# Patient Record
Sex: FEMALE | Race: OTHER | HISPANIC OR LATINO | URBAN - METROPOLITAN AREA
[De-identification: names, ages, dates, MRNs, and addresses within clinical notes are randomized per-mention and may not be internally consistent; named-entity substitution may affect disease eponyms.]

---

## 2017-04-03 ENCOUNTER — EMERGENCY (EMERGENCY)
Facility: HOSPITAL | Age: 22
LOS: 1 days | Discharge: HOME | End: 2017-04-03
Attending: EMERGENCY MEDICINE
Payer: SELF-PAY

## 2017-04-03 VITALS
SYSTOLIC BLOOD PRESSURE: 107 MMHG | OXYGEN SATURATION: 98 % | WEIGHT: 179.9 LBS | TEMPERATURE: 98 F | RESPIRATION RATE: 16 BRPM | HEIGHT: 62 IN | HEART RATE: 64 BPM | DIASTOLIC BLOOD PRESSURE: 57 MMHG

## 2017-04-03 DIAGNOSIS — R51 HEADACHE: ICD-10-CM

## 2017-04-03 DIAGNOSIS — L03.211 CELLULITIS OF FACE: ICD-10-CM

## 2017-04-03 PROCEDURE — 99283 EMERGENCY DEPT VISIT LOW MDM: CPT

## 2017-04-03 RX ORDER — IBUPROFEN 200 MG
600 TABLET ORAL ONCE
Qty: 0 | Refills: 0 | Status: COMPLETED | OUTPATIENT
Start: 2017-04-03 | End: 2017-04-03

## 2017-04-03 RX ORDER — IBUPROFEN 200 MG
1 TABLET ORAL
Qty: 30 | Refills: 0 | OUTPATIENT
Start: 2017-04-03 | End: 2017-04-13

## 2017-04-03 RX ORDER — AZTREONAM 2 G
1 VIAL (EA) INJECTION
Qty: 20 | Refills: 0 | OUTPATIENT
Start: 2017-04-03 | End: 2017-04-13

## 2017-04-03 RX ADMIN — Medication 1 TABLET(S): at 15:22

## 2017-04-03 RX ADMIN — Medication 600 MILLIGRAM(S): at 15:22

## 2017-04-03 NOTE — ED ADULT NURSE NOTE - OBJECTIVE STATEMENT
pt from home c/o of Rt side forehead bug bite x2 days pt is alert awake denies any fever Rt side forehead redness skin dry and intact small amt of swelling noted

## 2017-04-03 NOTE — ED PROVIDER NOTE - SKIN, MLM
Skin normal color for race, warm, dry and intact. there is a 3 cm area of erythema over the right upper forehead.

## 2017-04-03 NOTE — ED PROVIDER NOTE - OBJECTIVE STATEMENT
21 year old female came to the ED because of a rash with bumps to her face that is painful. She has noted these frequently and thinks that they are from insect bites. The latest rash is over the right forehead. No fever, no chills, no vomiting, no neck pain, no abd pain, no chest pain, no sob, no weakness. She reports a mild headache, no changes in vision and no pain with movement of the eyes.
